# Patient Record
Sex: MALE | Race: WHITE | NOT HISPANIC OR LATINO | Employment: UNEMPLOYED | URBAN - METROPOLITAN AREA
[De-identification: names, ages, dates, MRNs, and addresses within clinical notes are randomized per-mention and may not be internally consistent; named-entity substitution may affect disease eponyms.]

---

## 2019-03-31 ENCOUNTER — HOSPITAL ENCOUNTER (OUTPATIENT)
Facility: MEDICAL CENTER | Age: 59
End: 2019-03-31

## 2019-03-31 ENCOUNTER — HOSPITAL ENCOUNTER (OUTPATIENT)
Dept: RADIOLOGY | Facility: MEDICAL CENTER | Age: 59
End: 2019-03-31

## 2019-04-03 PROBLEM — F17.200 TOBACCO DEPENDENCE: Status: ACTIVE | Noted: 2019-04-03

## 2019-04-03 PROBLEM — I10 MALIGNANT HYPERTENSION: Status: ACTIVE | Noted: 2019-04-03

## 2019-04-03 PROBLEM — F10.20 UNCOMPLICATED ALCOHOL DEPENDENCE (HCC): Status: ACTIVE | Noted: 2019-04-03

## 2019-04-03 PROBLEM — N18.30 CKD (CHRONIC KIDNEY DISEASE) STAGE 3, GFR 30-59 ML/MIN: Status: ACTIVE | Noted: 2019-04-03

## 2019-04-10 PROBLEM — F10.20 UNCOMPLICATED ALCOHOL DEPENDENCE (HCC): Status: RESOLVED | Noted: 2019-04-03 | Resolved: 2019-04-10

## 2019-04-10 PROBLEM — I65.01 OCCLUSION OF RIGHT VERTEBRAL ARTERY: Status: ACTIVE | Noted: 2019-04-10

## 2019-05-10 PROBLEM — J30.2 SEASONAL ALLERGIES: Status: ACTIVE | Noted: 2019-05-10

## 2021-01-14 PROBLEM — I10 MALIGNANT HYPERTENSION: Status: RESOLVED | Noted: 2019-04-03 | Resolved: 2021-01-14

## 2021-02-18 ENCOUNTER — HOSPITAL ENCOUNTER (OUTPATIENT)
Dept: RADIOLOGY | Facility: MEDICAL CENTER | Age: 61
End: 2021-02-18
Payer: MEDICARE
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
## 2021-03-02 ENCOUNTER — OFFICE VISIT (OUTPATIENT)
Dept: SURGICAL ONCOLOGY | Facility: MEDICAL CENTER | Age: 61
End: 2021-03-02
Payer: MEDICARE

## 2021-03-02 VITALS
HEIGHT: 70 IN | DIASTOLIC BLOOD PRESSURE: 64 MMHG | WEIGHT: 201 LBS | TEMPERATURE: 96.6 F | HEART RATE: 61 BPM | SYSTOLIC BLOOD PRESSURE: 132 MMHG | OXYGEN SATURATION: 95 % | BODY MASS INDEX: 28.77 KG/M2

## 2021-03-02 DIAGNOSIS — R10.9 ABDOMINAL PAIN, UNSPECIFIED ABDOMINAL LOCATION: ICD-10-CM

## 2021-03-02 DIAGNOSIS — Z86.19 HX OF HEPATITIS C: ICD-10-CM

## 2021-03-02 DIAGNOSIS — I50.9 CONGESTIVE HEART FAILURE, UNSPECIFIED HF CHRONICITY, UNSPECIFIED HEART FAILURE TYPE (HCC): ICD-10-CM

## 2021-03-02 DIAGNOSIS — K76.6 PORTAL HYPERTENSION (HCC): ICD-10-CM

## 2021-03-02 DIAGNOSIS — K74.60 CIRRHOSIS OF LIVER WITHOUT ASCITES, UNSPECIFIED HEPATIC CIRRHOSIS TYPE (HCC): ICD-10-CM

## 2021-03-02 DIAGNOSIS — R93.5 ABNORMAL CT OF THE ABDOMEN: ICD-10-CM

## 2021-03-02 DIAGNOSIS — C22.0 HEPATOCELLULAR CARCINOMA (HCC): ICD-10-CM

## 2021-03-02 DIAGNOSIS — R93.5 ABNORMAL MRI OF ABDOMEN: ICD-10-CM

## 2021-03-02 PROCEDURE — 99205 OFFICE O/P NEW HI 60 MIN: CPT | Performed by: SURGERY

## 2021-03-02 RX ORDER — HYDRALAZINE HYDROCHLORIDE 50 MG/1
TABLET, FILM COATED ORAL
COMMUNITY
Start: 2021-01-27

## 2021-03-02 RX ORDER — PANTOPRAZOLE SODIUM 40 MG/1
40 TABLET, DELAYED RELEASE ORAL DAILY
COMMUNITY
Start: 2021-02-13

## 2021-03-02 ASSESSMENT — ENCOUNTER SYMPTOMS
ABDOMINAL PAIN: 1
WEIGHT LOSS: 1

## 2021-03-02 ASSESSMENT — FIBROSIS 4 INDEX: FIB4 SCORE: 1.84

## 2021-03-08 ENCOUNTER — PRE-ADMISSION TESTING (OUTPATIENT)
Dept: ADMISSIONS | Facility: MEDICAL CENTER | Age: 61
End: 2021-03-08
Attending: SURGERY
Payer: MEDICARE

## 2021-03-15 DIAGNOSIS — Z01.812 PRE-PROCEDURAL LABORATORY EXAMINATIONS: ICD-10-CM

## 2021-03-15 DIAGNOSIS — R10.9 ABDOMINAL PAIN, UNSPECIFIED ABDOMINAL LOCATION: ICD-10-CM

## 2021-03-15 DIAGNOSIS — C22.0 HEPATOCELLULAR CARCINOMA (HCC): ICD-10-CM

## 2021-03-17 NOTE — OR NURSING
COVID-19 Pre-surgery screenin. Do you have an undiagnosed respiratory illness or symptoms such as coughing or sneezing? No  a. Onset of Sx No  b. Acute vs. chronic respiratory illness No    2. Do you have an unexplained fever greater than 100.4 degrees Fahrenheit or 38 degrees Celsius?     No (Yes/No)    3. Have you had direct exposure to a patient who tested positive for Covid-19?    No    4. Have you had any loss of your sense of taste or smell? Have you had N/V or sore throat? No    Patient has been informed of visitor policy and asked to wear a mask upon entering the hospital   Yes

## 2021-03-18 ENCOUNTER — HOSPITAL ENCOUNTER (OUTPATIENT)
Facility: MEDICAL CENTER | Age: 61
End: 2021-03-18
Attending: SURGERY | Admitting: SURGERY
Payer: MEDICARE

## 2021-03-18 ENCOUNTER — ANESTHESIA EVENT (OUTPATIENT)
Dept: SURGERY | Facility: MEDICAL CENTER | Age: 61
End: 2021-03-18
Payer: MEDICARE

## 2021-03-18 ENCOUNTER — ANESTHESIA (OUTPATIENT)
Dept: SURGERY | Facility: MEDICAL CENTER | Age: 61
End: 2021-03-18
Payer: MEDICARE

## 2021-03-18 VITALS
BODY MASS INDEX: 27.36 KG/M2 | WEIGHT: 191.14 LBS | RESPIRATION RATE: 18 BRPM | TEMPERATURE: 97.4 F | DIASTOLIC BLOOD PRESSURE: 60 MMHG | HEART RATE: 54 BPM | SYSTOLIC BLOOD PRESSURE: 115 MMHG | OXYGEN SATURATION: 94 % | HEIGHT: 70 IN

## 2021-03-18 DIAGNOSIS — C22.0 HEPATOCELLULAR CARCINOMA (HCC): ICD-10-CM

## 2021-03-18 DIAGNOSIS — K76.6 PORTAL HYPERTENSION (HCC): ICD-10-CM

## 2021-03-18 DIAGNOSIS — K74.60 CIRRHOSIS OF LIVER WITHOUT ASCITES, UNSPECIFIED HEPATIC CIRRHOSIS TYPE (HCC): ICD-10-CM

## 2021-03-18 DIAGNOSIS — Z86.19 HX OF HEPATITIS C: ICD-10-CM

## 2021-03-18 PROBLEM — F12.20 MARIJUANA DEPENDENCE (HCC): Status: ACTIVE | Noted: 2021-03-18

## 2021-03-18 LAB
EKG IMPRESSION: NORMAL
PATHOLOGY CONSULT NOTE: NORMAL

## 2021-03-18 PROCEDURE — 110371 HCHG SHELL REV 272: Performed by: SURGERY

## 2021-03-18 PROCEDURE — 160046 HCHG PACU - 1ST 60 MINS PHASE II: Performed by: SURGERY

## 2021-03-18 PROCEDURE — 700102 HCHG RX REV CODE 250 W/ 637 OVERRIDE(OP): Performed by: ANESTHESIOLOGY

## 2021-03-18 PROCEDURE — 93005 ELECTROCARDIOGRAM TRACING: CPT | Performed by: SURGERY

## 2021-03-18 PROCEDURE — 160035 HCHG PACU - 1ST 60 MINS PHASE I: Performed by: SURGERY

## 2021-03-18 PROCEDURE — 501571 HCHG TROCAR, SEPARATOR 12X100: Performed by: SURGERY

## 2021-03-18 PROCEDURE — 501583 HCHG TROCAR, THRD CAN&SEAL 5X100: Performed by: SURGERY

## 2021-03-18 PROCEDURE — 700105 HCHG RX REV CODE 258: Performed by: ANESTHESIOLOGY

## 2021-03-18 PROCEDURE — 160041 HCHG SURGERY MINUTES - EA ADDL 1 MIN LEVEL 4: Performed by: SURGERY

## 2021-03-18 PROCEDURE — 88307 TISSUE EXAM BY PATHOLOGIST: CPT

## 2021-03-18 PROCEDURE — A9270 NON-COVERED ITEM OR SERVICE: HCPCS | Performed by: ANESTHESIOLOGY

## 2021-03-18 PROCEDURE — 93010 ELECTROCARDIOGRAM REPORT: CPT | Performed by: INTERNAL MEDICINE

## 2021-03-18 PROCEDURE — 700101 HCHG RX REV CODE 250: Performed by: ANESTHESIOLOGY

## 2021-03-18 PROCEDURE — 160009 HCHG ANES TIME/MIN: Performed by: SURGERY

## 2021-03-18 PROCEDURE — 47370 LAPARO ABLATE LIVER TUMOR RF: CPT | Performed by: SURGERY

## 2021-03-18 PROCEDURE — 501838 HCHG SUTURE GENERAL: Performed by: SURGERY

## 2021-03-18 PROCEDURE — 160029 HCHG SURGERY MINUTES - 1ST 30 MINS LEVEL 4: Performed by: SURGERY

## 2021-03-18 PROCEDURE — 700111 HCHG RX REV CODE 636 W/ 250 OVERRIDE (IP): Performed by: ANESTHESIOLOGY

## 2021-03-18 PROCEDURE — 160047 HCHG PACU  - EA ADDL 30 MINS PHASE II: Performed by: SURGERY

## 2021-03-18 PROCEDURE — 160002 HCHG RECOVERY MINUTES (STAT): Performed by: SURGERY

## 2021-03-18 PROCEDURE — 76940 US GUIDE TISSUE ABLATION: CPT | Mod: 26 | Performed by: SURGERY

## 2021-03-18 PROCEDURE — 501570 HCHG TROCAR, SEPARATOR: Performed by: SURGERY

## 2021-03-18 PROCEDURE — 160025 RECOVERY II MINUTES (STATS): Performed by: SURGERY

## 2021-03-18 PROCEDURE — 160048 HCHG OR STATISTICAL LEVEL 1-5: Performed by: SURGERY

## 2021-03-18 PROCEDURE — 700105 HCHG RX REV CODE 258: Performed by: SURGERY

## 2021-03-18 PROCEDURE — A9270 NON-COVERED ITEM OR SERVICE: HCPCS | Performed by: SURGERY

## 2021-03-18 PROCEDURE — 47100 WEDGE BIOPSY OF LIVER: CPT | Performed by: SURGERY

## 2021-03-18 PROCEDURE — 700101 HCHG RX REV CODE 250: Performed by: SURGERY

## 2021-03-18 PROCEDURE — 502571 HCHG PACK, LAP CHOLE: Performed by: SURGERY

## 2021-03-18 PROCEDURE — 501568 HCHG TROCAR, BLUNTPORT 12MM: Performed by: SURGERY

## 2021-03-18 DEVICE — GLUE BIOGLUE 2CC PRE-FILL (5EA/PK - 4 TIPS PER SYRINGE): Type: IMPLANTABLE DEVICE | Status: FUNCTIONAL

## 2021-03-18 RX ORDER — GLYCOPYRROLATE 0.2 MG/ML
INJECTION INTRAMUSCULAR; INTRAVENOUS PRN
Status: DISCONTINUED | OUTPATIENT
Start: 2021-03-18 | End: 2021-03-18 | Stop reason: SURG

## 2021-03-18 RX ORDER — TRAMADOL HYDROCHLORIDE 50 MG/1
50-100 TABLET ORAL EVERY 4 HOURS PRN
Qty: 40 TABLET | Refills: 1 | Status: SHIPPED | OUTPATIENT
Start: 2021-03-18 | End: 2021-03-28

## 2021-03-18 RX ORDER — HALOPERIDOL 5 MG/ML
1 INJECTION INTRAMUSCULAR
Status: DISCONTINUED | OUTPATIENT
Start: 2021-03-18 | End: 2021-03-18 | Stop reason: HOSPADM

## 2021-03-18 RX ORDER — DIGOXIN 125 MCG
125 TABLET ORAL EVERY EVENING
COMMUNITY
Start: 2020-11-25

## 2021-03-18 RX ORDER — HYDROMORPHONE HYDROCHLORIDE 1 MG/ML
0.1 INJECTION, SOLUTION INTRAMUSCULAR; INTRAVENOUS; SUBCUTANEOUS
Status: DISCONTINUED | OUTPATIENT
Start: 2021-03-18 | End: 2021-03-18 | Stop reason: HOSPADM

## 2021-03-18 RX ORDER — DIPHENHYDRAMINE HYDROCHLORIDE 50 MG/ML
12.5 INJECTION INTRAMUSCULAR; INTRAVENOUS
Status: DISCONTINUED | OUTPATIENT
Start: 2021-03-18 | End: 2021-03-18 | Stop reason: HOSPADM

## 2021-03-18 RX ORDER — MIDAZOLAM HYDROCHLORIDE 1 MG/ML
1 INJECTION INTRAMUSCULAR; INTRAVENOUS
Status: DISCONTINUED | OUTPATIENT
Start: 2021-03-18 | End: 2021-03-18 | Stop reason: HOSPADM

## 2021-03-18 RX ORDER — SODIUM CHLORIDE, SODIUM LACTATE, POTASSIUM CHLORIDE, CALCIUM CHLORIDE 600; 310; 30; 20 MG/100ML; MG/100ML; MG/100ML; MG/100ML
INJECTION, SOLUTION INTRAVENOUS CONTINUOUS
Status: DISCONTINUED | OUTPATIENT
Start: 2021-03-18 | End: 2021-03-18 | Stop reason: HOSPADM

## 2021-03-18 RX ORDER — BUPIVACAINE HYDROCHLORIDE AND EPINEPHRINE 5; 5 MG/ML; UG/ML
INJECTION, SOLUTION PERINEURAL
Status: DISCONTINUED | OUTPATIENT
Start: 2021-03-18 | End: 2021-03-18 | Stop reason: HOSPADM

## 2021-03-18 RX ORDER — METOPROLOL TARTRATE 1 MG/ML
1 INJECTION, SOLUTION INTRAVENOUS
Status: DISCONTINUED | OUTPATIENT
Start: 2021-03-18 | End: 2021-03-18 | Stop reason: HOSPADM

## 2021-03-18 RX ORDER — LABETALOL HYDROCHLORIDE 5 MG/ML
5 INJECTION, SOLUTION INTRAVENOUS
Status: DISCONTINUED | OUTPATIENT
Start: 2021-03-18 | End: 2021-03-18 | Stop reason: HOSPADM

## 2021-03-18 RX ORDER — HYDROMORPHONE HYDROCHLORIDE 1 MG/ML
0.2 INJECTION, SOLUTION INTRAMUSCULAR; INTRAVENOUS; SUBCUTANEOUS
Status: DISCONTINUED | OUTPATIENT
Start: 2021-03-18 | End: 2021-03-18 | Stop reason: HOSPADM

## 2021-03-18 RX ORDER — CEFAZOLIN SODIUM 1 G/3ML
INJECTION, POWDER, FOR SOLUTION INTRAMUSCULAR; INTRAVENOUS PRN
Status: DISCONTINUED | OUTPATIENT
Start: 2021-03-18 | End: 2021-03-18 | Stop reason: SURG

## 2021-03-18 RX ORDER — ONDANSETRON 2 MG/ML
4 INJECTION INTRAMUSCULAR; INTRAVENOUS
Status: DISCONTINUED | OUTPATIENT
Start: 2021-03-18 | End: 2021-03-18 | Stop reason: HOSPADM

## 2021-03-18 RX ORDER — SUCCINYLCHOLINE/SOD CL,ISO/PF 200MG/10ML
SYRINGE (ML) INTRAVENOUS PRN
Status: DISCONTINUED | OUTPATIENT
Start: 2021-03-18 | End: 2021-03-18 | Stop reason: SURG

## 2021-03-18 RX ORDER — CLONIDINE 0.3 MG/24H
1 PATCH, EXTENDED RELEASE TRANSDERMAL
COMMUNITY

## 2021-03-18 RX ORDER — NEOSTIGMINE METHYLSULFATE 1 MG/ML
INJECTION, SOLUTION INTRAVENOUS PRN
Status: DISCONTINUED | OUTPATIENT
Start: 2021-03-18 | End: 2021-03-18 | Stop reason: SURG

## 2021-03-18 RX ORDER — LIDOCAINE HYDROCHLORIDE 20 MG/ML
INJECTION, SOLUTION EPIDURAL; INFILTRATION; INTRACAUDAL; PERINEURAL PRN
Status: DISCONTINUED | OUTPATIENT
Start: 2021-03-18 | End: 2021-03-18 | Stop reason: SURG

## 2021-03-18 RX ORDER — IPRATROPIUM BROMIDE AND ALBUTEROL SULFATE 2.5; .5 MG/3ML; MG/3ML
3 SOLUTION RESPIRATORY (INHALATION)
Status: DISCONTINUED | OUTPATIENT
Start: 2021-03-18 | End: 2021-03-18 | Stop reason: HOSPADM

## 2021-03-18 RX ORDER — OXYCODONE HCL 5 MG/5 ML
10 SOLUTION, ORAL ORAL
Status: COMPLETED | OUTPATIENT
Start: 2021-03-18 | End: 2021-03-18

## 2021-03-18 RX ORDER — HYDRALAZINE HYDROCHLORIDE 20 MG/ML
5 INJECTION INTRAMUSCULAR; INTRAVENOUS
Status: DISCONTINUED | OUTPATIENT
Start: 2021-03-18 | End: 2021-03-18 | Stop reason: HOSPADM

## 2021-03-18 RX ORDER — DOXAZOSIN 8 MG/1
8 TABLET ORAL EVERY EVENING
COMMUNITY
Start: 2021-01-18

## 2021-03-18 RX ORDER — MEPERIDINE HYDROCHLORIDE 25 MG/ML
12.5 INJECTION INTRAMUSCULAR; INTRAVENOUS; SUBCUTANEOUS
Status: DISCONTINUED | OUTPATIENT
Start: 2021-03-18 | End: 2021-03-18 | Stop reason: HOSPADM

## 2021-03-18 RX ORDER — ROCURONIUM BROMIDE 10 MG/ML
INJECTION, SOLUTION INTRAVENOUS PRN
Status: DISCONTINUED | OUTPATIENT
Start: 2021-03-18 | End: 2021-03-18 | Stop reason: SURG

## 2021-03-18 RX ORDER — OXYCODONE HCL 5 MG/5 ML
5 SOLUTION, ORAL ORAL
Status: COMPLETED | OUTPATIENT
Start: 2021-03-18 | End: 2021-03-18

## 2021-03-18 RX ORDER — PROMETHAZINE HYDROCHLORIDE 25 MG/1
12.5 TABLET ORAL EVERY 6 HOURS PRN
Qty: 30 TABLET | Refills: 0 | Status: SHIPPED | OUTPATIENT
Start: 2021-03-18

## 2021-03-18 RX ORDER — HYDROMORPHONE HYDROCHLORIDE 1 MG/ML
0.4 INJECTION, SOLUTION INTRAMUSCULAR; INTRAVENOUS; SUBCUTANEOUS
Status: DISCONTINUED | OUTPATIENT
Start: 2021-03-18 | End: 2021-03-18 | Stop reason: HOSPADM

## 2021-03-18 RX ORDER — SODIUM CHLORIDE, SODIUM LACTATE, POTASSIUM CHLORIDE, CALCIUM CHLORIDE 600; 310; 30; 20 MG/100ML; MG/100ML; MG/100ML; MG/100ML
INJECTION, SOLUTION INTRAVENOUS CONTINUOUS
Status: DISCONTINUED | OUTPATIENT
Start: 2021-03-18 | End: 2021-03-18

## 2021-03-18 RX ADMIN — SODIUM CHLORIDE, POTASSIUM CHLORIDE, SODIUM LACTATE AND CALCIUM CHLORIDE: 600; 310; 30; 20 INJECTION, SOLUTION INTRAVENOUS at 08:30

## 2021-03-18 RX ADMIN — FENTANYL CITRATE 100 MCG: 50 INJECTION, SOLUTION INTRAMUSCULAR; INTRAVENOUS at 07:42

## 2021-03-18 RX ADMIN — OXYCODONE HYDROCHLORIDE 10 MG: 5 SOLUTION ORAL at 08:28

## 2021-03-18 RX ADMIN — FENTANYL CITRATE 100 MCG: 50 INJECTION, SOLUTION INTRAMUSCULAR; INTRAVENOUS at 07:14

## 2021-03-18 RX ADMIN — LIDOCAINE HYDROCHLORIDE 0.5 ML: 10 INJECTION, SOLUTION EPIDURAL; INFILTRATION; INTRACAUDAL at 05:51

## 2021-03-18 RX ADMIN — NEOSTIGMINE METHYLSULFATE 3 MG: 1 INJECTION INTRAVENOUS at 08:10

## 2021-03-18 RX ADMIN — FENTANYL CITRATE 50 MCG: 50 INJECTION, SOLUTION INTRAMUSCULAR; INTRAVENOUS at 08:12

## 2021-03-18 RX ADMIN — LIDOCAINE HYDROCHLORIDE 100 MG: 20 INJECTION, SOLUTION EPIDURAL; INFILTRATION; INTRACAUDAL at 07:05

## 2021-03-18 RX ADMIN — GLYCOPYRROLATE 0.2 MG: 0.2 INJECTION INTRAMUSCULAR; INTRAVENOUS at 08:10

## 2021-03-18 RX ADMIN — GLYCOPYRROLATE 0.2 MG: 0.2 INJECTION INTRAMUSCULAR; INTRAVENOUS at 07:22

## 2021-03-18 RX ADMIN — ROCURONIUM BROMIDE 20 MG: 10 INJECTION, SOLUTION INTRAVENOUS at 07:22

## 2021-03-18 RX ADMIN — Medication 140 MG: at 07:05

## 2021-03-18 RX ADMIN — EPHEDRINE SULFATE 10 MG: 50 INJECTION, SOLUTION INTRAVENOUS at 07:28

## 2021-03-18 RX ADMIN — PROPOFOL 200 MG: 10 INJECTION, EMULSION INTRAVENOUS at 07:05

## 2021-03-18 RX ADMIN — SODIUM CHLORIDE, POTASSIUM CHLORIDE, SODIUM LACTATE AND CALCIUM CHLORIDE: 600; 310; 30; 20 INJECTION, SOLUTION INTRAVENOUS at 06:38

## 2021-03-18 RX ADMIN — GLYCOPYRROLATE 0.2 MG: 0.2 INJECTION INTRAMUSCULAR; INTRAVENOUS at 07:20

## 2021-03-18 RX ADMIN — ROCURONIUM BROMIDE 10 MG: 10 INJECTION, SOLUTION INTRAVENOUS at 07:05

## 2021-03-18 RX ADMIN — POVIDONE IODINE 15 ML: 100 SOLUTION TOPICAL at 05:51

## 2021-03-18 RX ADMIN — CEFAZOLIN 2 G: 330 INJECTION, POWDER, FOR SOLUTION INTRAMUSCULAR; INTRAVENOUS at 07:11

## 2021-03-18 ASSESSMENT — FIBROSIS 4 INDEX: FIB4 SCORE: 1.84
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
## 2021-03-18 NOTE — OR NURSING
Continues to take po well. vss remain stable.  Dc instructions given and verbalized understanding.  To  scripts at [facility] in [place]. Wheeled to car.  Home with friend.

## 2021-03-18 NOTE — ANESTHESIA PROCEDURE NOTES
Airway    Date/Time: 3/18/2021 7:06 AM  Performed by: Balta Tipton M.D.  Authorized by: Balta Tipton M.D.     Location:  OR  Urgency:  Elective  Difficult Airway: No    Indications for Airway Management:  Anesthesia      Spontaneous Ventilation: absent    Sedation Level:  Deep  Preoxygenated: Yes    Patient Position:  Sniffing  Mask Difficulty Assessment:  2 - vent by mask + OA or adjuvant +/- NMBA  Final Airway Type:  Endotracheal airway  Final Endotracheal Airway:  ETT  Cuffed: Yes    Technique Used for Successful ETT Placement:  Direct laryngoscopy    Insertion Site:  Oral  Blade Type:  Lynch  Laryngoscope Blade/Videolaryngoscope Blade Size:  3  ETT Size (mm):  8.0  Measured from:  Gums  ETT to Gums (cm):  23  Placement Verified by: auscultation and capnometry    Cormack-Lehane Classification:  Grade I - full view of glottis  Number of Attempts at Approach:  1

## 2021-03-18 NOTE — ANESTHESIA TIME REPORT
Anesthesia Start and Stop Event Times     Date Time Event    3/18/2021 0655 Ready for Procedure     0702 Anesthesia Start     0821 Anesthesia Stop        Responsible Staff  03/18/21    Name Role Begin End    Balta Tipton M.D. Anesth 0702 0821        Preop Diagnosis (Free Text):  Pre-op Diagnosis     LIVER CANCER        Preop Diagnosis (Codes):    Post op Diagnosis  Liver cancer (HCC)      Premium Reason  Non-Premium    Comments:

## 2021-03-18 NOTE — ANESTHESIA POSTPROCEDURE EVALUATION
Patient: Brian Myles    Procedure Summary     Date: 03/18/21 Room / Location: [place] [address] / SURGERY [facility]    Anesthesia Start: 0702 Anesthesia Stop: 0821    Procedures:       ABLATION, LESION, LIVER, LAPAROSCOPIC - SEGMENT 2/3 TUMOR. (Abdomen)      BIOPSY, LIVER (Abdomen) Diagnosis: (LIVER CANCER)    Surgeons: Chiki Chadwick M.D. Responsible Provider: Balta Tipton M.D.    Anesthesia Type: general ASA Status: 3          Final Anesthesia Type: general  Last vitals  BP   Blood Pressure: 119/57    Temp   36.6 °C (97.9 °F)    Pulse   63   Resp   18    SpO2   92 %      Anesthesia Post Evaluation    Patient location during evaluation: PACU  Patient participation: complete - patient participated  Level of consciousness: awake and alert    Airway patency: patent  Anesthetic complications: no  Cardiovascular status: hemodynamically stable  Respiratory status: acceptable  Hydration status: euvolemic    PONV: none          No complications documented.     Nurse Pain Score: 0 (NPRS)

## 2021-03-18 NOTE — DISCHARGE INSTRUCTIONS
ACTIVITY: Rest and take it easy for the first 24 hours.  A responsible adult is recommended to remain with you during that time.  It is normal to feel sleepy.  We encourage you to not do anything that requires balance, judgment or coordination.    MILD FLU-LIKE SYMPTOMS ARE NORMAL. YOU MAY EXPERIENCE GENERALIZED MUSCLE ACHES, THROAT IRRITATION, HEADACHE AND/OR SOME NAUSEA.    FOR 24 HOURS DO NOT:  Drive, operate machinery or run household appliances.  Drink beer or alcoholic beverages.   Make important decisions or sign legal documents.    OK to shower in AM with dressings on  DC dressing in 6 days  Follow-up with office MA for staple removal after 10-14 days  Follow up with Dr. Chadwick after completing 1 month  CT of liver imaging    DIET: To avoid nausea, slowly advance diet as tolerated, avoiding spicy or greasy foods for the first day.  Add more substantial food to your diet according to your physician's instructions.  Babies can be fed formula or breast milk as soon as they are hungry.  INCREASE FLUIDS AND FIBER TO AVOID CONSTIPATION.    FOLLOW-UP APPOINTMENT:  A follow-up appointment should be arranged with your doctor in 10 to 14 days; call to schedule.    You should CALL YOUR PHYSICIAN if you develop:  Fever greater than 101 degrees F.  Pain not relieved by medication, or persistent nausea or vomiting.  Excessive bleeding (blood soaking through dressing) or unexpected drainage from the wound.  Extreme redness or swelling around the incision site, drainage of pus or foul smelling drainage.  Inability to urinate or empty your bladder within 8 hours.  Problems with breathing or chest pain.    You should call 911 if you develop problems with breathing or chest pain.  If you are unable to contact your doctor or surgical center, you should go to the nearest emergency room or urgent care center.      Physician's telephone #: [phone] Dr. Chadwick    If any questions arise, call your doctor.  If your  doctor is not available, please feel free to call the Surgical Center at [phone]. The Contact Center is open Monday through Friday 7AM to 5PM and may speak to a nurse at [phone], or toll free at [phone].     A registered nurse may call you a few days after your surgery to see how you are doing after your procedure.    MEDICATIONS: Resume taking daily medication.  Take prescribed pain medication with food.  If no medication is prescribed, you may take non-aspirin pain medication if needed.  PAIN MEDICATION CAN BE VERY CONSTIPATING.  Take a stool softener or laxative such as senokot, pericolace, or milk of magnesia if needed.    Prescription given for Phenergan, Ultram, at pharmacy in [facility].  Last pain medication given at 8:28 AM.    If your physician has prescribed pain medication that includes Acetaminophen (Tylenol), do not take additional Acetaminophen (Tylenol) while taking the prescribed medication.    Depression / Suicide Risk    As you are discharged from this [facility] facility, it is important to learn how to keep safe from harming yourself.    Recognize the warning signs:  · Abrupt changes in personality, positive or negative- including increase in energy   · Giving away possessions  · Change in eating patterns- significant weight changes-  positive or negative  · Change in sleeping patterns- unable to sleep or sleeping all the time   · Unwillingness or inability to communicate  · Depression  · Unusual sadness, discouragement and loneliness  · Talk of wanting to die  · Neglect of personal appearance   · Rebelliousness- reckless behavior  · Withdrawal from people/activities they love  · Confusion- inability to concentrate     If you or a loved one observes any of these behaviors or has concerns about self-harm, here's what you can do:  · Talk about it- your feelings and reasons for harming yourself  · Remove any means that you might use to hurt yourself (examples:  pills, rope, extension cords, firearm)  · Get professional help from the community (Mental Health, Substance Abuse, psychological counseling)  · Do not be alone:Call your Safe Contact- someone whom you trust who will be there for you.  · Call your local CRISIS HOTLINE 125-6195 or 301-820-2000  · Call your local Children's Mobile Crisis Response Team [place] (135) 757-6569 or www.[employer]  · Call the toll free National Suicide Prevention Hotlines   · National Suicide Prevention Lifeline 284-748-BDYU (1500)  National Hope Line Network 800-SUICIDE (335-9186)
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
## 2021-03-22 ENCOUNTER — PATIENT OUTREACH (OUTPATIENT)
Dept: OTHER | Facility: MEDICAL CENTER | Age: 61
End: 2021-03-22

## 2021-03-22 NOTE — PROGRESS NOTES
On 3/22/2021 at 1526 this ONN called patient. Patient states he is doing well. Patient states he feels good. Patient verifies that he has Dr. Chadwick' office phone number. Patient states he knows to call and make a follow-up appointment to be seen in two weeks. This ONN reinforced importance of making and keeping follow-up appointment. Patient made verbal understanding. Patient denies questions or concerns, thanked ONN for calling.

## 2021-04-05 NOTE — PROGRESS NOTES
Subjective:      Primary care physician:Oscar Araiza P.A.-C.  Referring Provider: Balta Downey MD      Chief Complaint: No chief complaint on file.    Diagnosis:   1. Hepatocellular carcinoma (HCC)     2. Abdominal pain, unspecified abdominal location     3. Cirrhosis of liver without ascites, unspecified hepatic cirrhosis type (HCC)         History of presenting illness:  Brian Myles  is a pleasant 61 y.o. year old male who presented with postop visit for his staple removal.  He denies any fever or chills, nausea or vomiting.  He has no pain.  He is here for staple removal.  He will be scheduled for a repeat CT scan in 2 weeks.      Past Medical History:   Diagnosis Date   • Cancer (HCC) 2021    liver ?   • Dental disorder 2021    only 4 teeth   • Hepatitis C 2000   • Hypertension    • Myocardial infarct (HCC) 09/2019    open heart CABG & maze   • Pneumonia 1990   • Stroke (HCC) 05/31/2019     Past Surgical History:   Procedure Laterality Date   • HEPATIC ABLATION LAPAROSCOPIC  3/18/2021    Procedure: ABLATION, LESION, LIVER, LAPAROSCOPIC - SEGMENT 2/3 TUMOR.;  Surgeon: Chiki Chawdick M.D.;  Location: SURGERY [facility];  Service: General   • LIVER BIOPSY  3/18/2021    Procedure: BIOPSY, LIVER WEDGE;  Surgeon: Chiki Chadwick M.D.;  Location: SURGERY [facility];  Service: General   • OTHER CARDIAC SURGERY  2019    CABG x3   • OTHER ORTHOPEDIC SURGERY Right 1978    knee replacement     No Known Allergies  Outpatient Encounter Medications as of 4/6/2021   Medication Sig Dispense Refill   • cloNIDine (CATAPRES) 0.3 MG/24HR PATCH WEEKLY patch Place 1 Patch on the skin every 7 days.     • doxazosin (CARDURA) 8 MG tablet Take 8 mg by mouth every evening.     • digoxin (LANOXIN) 125 MCG Tab Take 125 mcg by mouth every evening.     • promethazine (PHENERGAN) 25 MG Tab Take 0.5 Tablets by mouth every 6 hours as needed. 30 tablet 0   • pantoprazole (PROTONIX) 40 MG Tablet Delayed Response  Take 40 mg by mouth every day.     • aspirin EC (ECOTRIN) 81 MG Tablet Delayed Response Take 81 mg by mouth every evening.     • hydrALAZINE (APRESOLINE) 50 MG Tab Take 1 tablet by mouth three times daily     • IRON CR PO Take 1 tablet by mouth every evening. OTC     • amiodarone (CORDARONE) 200 MG Tab Take 200 mg by mouth every evening.     • lisinopril (PRINIVIL) 40 MG tablet Take 1 Tab by mouth every day. 90 Tab 3   • atorvastatin (LIPITOR) 80 MG tablet Take 1 Tab by mouth every evening. 30 Tab 5   • carvedilol (COREG) 25 MG Tab Take 1 Tab by mouth 2 times a day, with meals. 60 Tab 5   • doxazosin (CARDURA) 4 MG Tab Take 0.5 Tabs by mouth 2 Times a Day. (Patient not taking: Reported on 3/18/2021) 30 Tab 5   • aspirin  MG Tablet Delayed Response Take 1 Tab by mouth every day. (Patient not taking: Reported on 3/18/2021) 30 Tab 5     No facility-administered encounter medications on file as of 4/6/2021.     Social History     Socioeconomic History   • Marital status: Single     Spouse name: Not on file   • Number of children: Not on file   • Years of education: Not on file   • Highest education level: Not on file   Occupational History   • Not on file   Tobacco Use   • Smoking status: Heavy Tobacco Smoker     Packs/day: 1.50     Years: 52.00     Pack years: 78.00     Types: Cigarettes   • Smokeless tobacco: Never Used   Substance and Sexual Activity   • Alcohol use: Not Currently     Alcohol/week: 3.6 oz     Types: 6 Cans of beer per week     Comment: last new years 2021   • Drug use: Yes     Types: Inhaled     Comment: marijuana since 1968 daily, 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   • Sexual activity: Never   Other Topics Concern   • Not on file   Social History Narrative   • Not on file     Social Determinants of Health     Financial Resource Strain:    • Difficulty of Paying Living Expenses:    Food Insecurity:    • Worried About Running Out of Food in the Last Year:    • Ran Out of Food in the Last Year:     Transportation Needs:    • Lack of Transportation (Medical):    • Lack of Transportation (Non-Medical):    Physical Activity:    • Days of Exercise per Week:    • Minutes of Exercise per Session:    Stress:    • Feeling of Stress :    Social Connections:    • Frequency of Communication with Friends and Family:    • Frequency of Social Gatherings with Friends and Family:    • Attends Buddhist Services:    • Active Member of Clubs or Organizations:    • Attends Club or Organization Meetings:    • Marital Status:    Intimate Partner Violence:    • Fear of Current or Ex-Partner:    • Emotionally Abused:    • Physically Abused:    • Sexually Abused:       Social History     Tobacco Use   Smoking Status Heavy Tobacco Smoker   • Packs/day: 1.50   • Years: 52.00   • Pack years: 78.00   • Types: Cigarettes   Smokeless Tobacco Never Used     Social History     Substance and Sexual Activity   Alcohol Use Not Currently   • Alcohol/week: 3.6 oz   • Types: 6 Cans of beer per week    Comment: last new years 2021     Social History     Substance and Sexual Activity   Drug Use Yes   • Types: Inhaled    Comment: marijuana since 1968 daily, 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  No pertinent family history on file    Review of Systems   All other systems reviewed and are negative.       Objective:   There were no vitals taken for this visit.    Physical Exam   Abdominal: Soft. Bowel sounds are normal.   Abdomen is soft, dry, and intact including his incisions.  He has no pain.  Staples are removed here in the office and Steri-Strips and benzoin were applied.   Nursing note and vitals reviewed.      Labs: 3/2/21  WBC Count 3.7 - 10.6 x10^3/ul 6.2    RBC Count 4.50 - 5.70 x10^6/ul 5.25    HGB 13.0 - 16.7 g/dl 16.7    Hct 38.8 - 49.7 % 49.1    MCV 83.0 - 99.0 fl 93.6    MCH 28.0 - 33.8 pg 31.9    MCHC 33.1 - 36.5 g/dL 34.1    RDW 11.8 - 14.0 % 14.2High     Platelet Count 146 - 390 x10^3/uL 146    MPV 6.4 -  10.2 fl 9.4      Sodium 136 - 144 mmol/L 134Low     POTASSIUM 3.6 - 5.1 mmol/L 4.2    Chloride 101 - 111 mmol/L 100Low     CARBON DIOXIDE 22 - 32 mmol/L 25    Anion Gap 2 - 11 mmol/L 10    Calcium, S/P 8.7 - 10.3 mg/dl 8.7Low     Glucose, Fasting 60 - 99 mg/dl 101High     BUN 8 - 20 mg/dl 17    Creatinine 0.80 - 1.40 mg/dl 1.73High     Protein, Total 6.4 - 8.2 g/dl 7.0    Albumin 3.5 - 4.8 g/dl 3.7    ALKALINE PHOSPHATASE 38 - 126 IU/L 57    ALT 17 - 63 IU/L 70High     AST 15 - 41 IU/L 46High     Bilirubin, Total 0.4 - 2.0 mg/dl 1.0    BILIRUBIN, DIRECT 0.0 - 0.3 mg/dl 0.3    Indirect Bilirubin 0.0 - 1.5 mg/dl 0.7    Globulin 2.6 - 3.1 g/dl 3.3High     Albumin/Globulin Ratio 1.00 - 2.20 Ratio 1.12    EGFRE Calc >60 ml/min/1.7 40      Alpha Fetoprotein L3% 0.5 - 9.9 % 29.2High      Vipul-gamma-carboxy Prothrombin <7.5 ng/mL 7.2      Imagin21 MRI      Per my read,   Left liver masses compatible with a hepatocellular carcinoma given the    background of cirrhosis.        Trace left pleural effusion.        Left renal scarring with left renal cysts.        21 CT      1. Cirrhotic liver with exophytic left hepatic lobe mass which is not further    characterized without IV contrast, however is concerning for hepatocellular    carcinoma. Multiphase MR versus CT is recommended if the patient is able to    tolerate IV contrast.        2. Left renal scarring.             Pathology: 3/18/21    FINAL DIAGNOSIS:     A. Liver tumor:          Well to moderately differentiated hepatocellular carcinoma.     Procedures: 3/18/21    1.  Laparoscopic microwave thermal ablation of a 3.5 cm left-sided hepatoma   between segment 2 and 3 posteriorly using multiple 4 minute at 100 watt   ablation cavities giving us approximately 4 x 4.5 cm ablation cavity   overlapping.  2.  Liver wedge resection of tumor for pathology segment 2/3    Diagnosis:     1. Hepatocellular carcinoma (HCC)     2. Abdominal pain, unspecified abdominal  location     3. Cirrhosis of liver without ascites, unspecified hepatic cirrhosis type (HCC)             Medical Decision Making:  Today's Assessment / Status / Plan:     In light of the present findings, the patient will undergo repeat surveillance imaging following his ablation in 2 weeks and then see me after 18 April.  I have instructed him with an appointment already and he knows that he will get called.  I am also recommending that the patient get a referral to GI consultants near him so that he could start on treatment of his hep C.  He is very anxious to do that treatment so that he can think prove his liver function.  We will see him in 3 weeks.    I, Dr. Chadwick have entered, reviewed and confirmed the above diagnoses related to this patient on this date of service, 4/6/2021  2:05 PM.    He agreed and verbalized his agreement and understanding with the current plan. I answered all questions and concerns he has at this time and advised him to call at any time in the interim with questions or concerns in regards to his care.    Thank you for allowing me to participate in his care, I will continue to follow closely.       Please note that this dictation was created using voice recognition software. I have made every reasonable attempt to correct obvious errors, but I expect that there are errors of grammar and possibly content that I did not discover before finalizing the note.     Thank you for this consultation and allowing me to participate in your patient's care. If I can be of further service please contact my office.

## 2021-04-06 ENCOUNTER — OFFICE VISIT (OUTPATIENT)
Dept: SURGICAL ONCOLOGY | Facility: MEDICAL CENTER | Age: 61
End: 2021-04-06
Payer: MEDICARE

## 2021-04-06 VITALS
WEIGHT: 185 LBS | OXYGEN SATURATION: 95 % | BODY MASS INDEX: 28.04 KG/M2 | HEART RATE: 65 BPM | HEIGHT: 68 IN | TEMPERATURE: 97 F | DIASTOLIC BLOOD PRESSURE: 78 MMHG | SYSTOLIC BLOOD PRESSURE: 132 MMHG

## 2021-04-06 DIAGNOSIS — R10.9 ABDOMINAL PAIN, UNSPECIFIED ABDOMINAL LOCATION: ICD-10-CM

## 2021-04-06 DIAGNOSIS — C22.0 HEPATOCELLULAR CARCINOMA (HCC): ICD-10-CM

## 2021-04-06 DIAGNOSIS — K74.60 CIRRHOSIS OF LIVER WITHOUT ASCITES, UNSPECIFIED HEPATIC CIRRHOSIS TYPE (HCC): ICD-10-CM

## 2021-04-06 PROCEDURE — 99024 POSTOP FOLLOW-UP VISIT: CPT | Performed by: SURGERY

## 2021-04-06 ASSESSMENT — FIBROSIS 4 INDEX: FIB4 SCORE: 1.84

## 2021-04-19 ENCOUNTER — HOSPITAL ENCOUNTER (OUTPATIENT)
Dept: RADIOLOGY | Facility: MEDICAL CENTER | Age: 61
End: 2021-04-19
Payer: MEDICARE

## 2021-04-19 NOTE — PROGRESS NOTES
Subjective:      Primary care physician:Oscar Araiza P.A.-C.  Referring Provider: Balta Downey MD      Chief Complaint: No chief complaint on file.    Diagnosis:   1. Hepatocellular carcinoma (HCC)     2. Cirrhosis of liver without ascites, unspecified hepatic cirrhosis type (HCC)     3. Hx of hepatitis C         History of presenting illness:  Brian Myles  is a pleasant 61 y.o. year old male who presented with ***      Past Medical History:   Diagnosis Date   • Cancer (HCC) 2021    liver ?   • Dental disorder 2021    only 4 teeth   • Hepatitis C 2000   • Hypertension    • Myocardial infarct (HCC) 09/2019    open heart CABG & maze   • Pneumonia 1990   • Stroke (HCC) 05/31/2019     Past Surgical History:   Procedure Laterality Date   • HEPATIC ABLATION LAPAROSCOPIC  3/18/2021    Procedure: ABLATION, LESION, LIVER, LAPAROSCOPIC - SEGMENT 2/3 TUMOR.;  Surgeon: Chiki Chadwick M.D.;  Location: SURGERY [facility];  Service: General   • LIVER BIOPSY  3/18/2021    Procedure: BIOPSY, LIVER WEDGE;  Surgeon: Chiki Chadwick M.D.;  Location: SURGERY [facility];  Service: General   • OTHER CARDIAC SURGERY  2019    CABG x3   • OTHER ORTHOPEDIC SURGERY Right 1978    knee replacement     No Known Allergies  Outpatient Encounter Medications as of 4/20/2021   Medication Sig Dispense Refill   • cloNIDine (CATAPRES) 0.3 MG/24HR PATCH WEEKLY patch Place 1 Patch on the skin every 7 days.     • doxazosin (CARDURA) 8 MG tablet Take 8 mg by mouth every evening.     • digoxin (LANOXIN) 125 MCG Tab Take 125 mcg by mouth every evening.     • promethazine (PHENERGAN) 25 MG Tab Take 0.5 Tablets by mouth every 6 hours as needed. 30 tablet 0   • pantoprazole (PROTONIX) 40 MG Tablet Delayed Response Take 40 mg by mouth every day.     • aspirin EC (ECOTRIN) 81 MG Tablet Delayed Response Take 81 mg by mouth every evening.     • hydrALAZINE (APRESOLINE) 50 MG Tab Take 1 tablet by mouth three times daily     • IRON CR  PO Take 1 tablet by mouth every evening. OTC     • amiodarone (CORDARONE) 200 MG Tab Take 200 mg by mouth every evening.     • lisinopril (PRINIVIL) 40 MG tablet Take 1 Tab by mouth every day. 90 Tab 3   • atorvastatin (LIPITOR) 80 MG tablet Take 1 Tab by mouth every evening. 30 Tab 5   • carvedilol (COREG) 25 MG Tab Take 1 Tab by mouth 2 times a day, with meals. 60 Tab 5   • doxazosin (CARDURA) 4 MG Tab Take 0.5 Tabs by mouth 2 Times a Day. (Patient not taking: Reported on 3/18/2021) 30 Tab 5   • aspirin  MG Tablet Delayed Response Take 1 Tab by mouth every day. (Patient not taking: Reported on 3/18/2021) 30 Tab 5     No facility-administered encounter medications on file as of 4/20/2021.     Social History     Socioeconomic History   • Marital status: Single     Spouse name: Not on file   • Number of children: Not on file   • Years of education: Not on file   • Highest education level: Not on file   Occupational History   • Not on file   Tobacco Use   • Smoking status: Heavy Tobacco Smoker     Packs/day: 1.50     Years: 52.00     Pack years: 78.00     Types: Cigarettes   • Smokeless tobacco: Never Used   Substance and Sexual Activity   • Alcohol use: Not Currently     Alcohol/week: 3.6 oz     Types: 6 Cans of beer per week     Comment: last new years 2021   • Drug use: Yes     Types: Inhaled     Comment: marijuana since 1968 daily, 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   • Sexual activity: Never   Other Topics Concern   • Not on file   Social History Narrative   • Not on file     Social Determinants of Health     Financial Resource Strain:    • Difficulty of Paying Living Expenses:    Food Insecurity:    • Worried About Running Out of Food in the Last Year:    • Ran Out of Food in the Last Year:    Transportation Needs:    • Lack of Transportation (Medical):    • Lack of Transportation (Non-Medical):    Physical Activity:    • Days of Exercise per Week:    • Minutes of Exercise per Session:    Stress:    • Feeling  of Stress :    Social Connections:    • Frequency of Communication with Friends and Family:    • Frequency of Social Gatherings with Friends and Family:    • Attends Anabaptism Services:    • Active Member of Clubs or Organizations:    • Attends Club or Organization Meetings:    • Marital Status:    Intimate Partner Violence:    • Fear of Current or Ex-Partner:    • Emotionally Abused:    • Physically Abused:    • Sexually Abused:       Social History     Tobacco Use   Smoking Status Heavy Tobacco Smoker   • Packs/day: 1.50   • Years: 52.00   • Pack years: 78.00   • Types: Cigarettes   Smokeless Tobacco Never Used     Social History     Substance and Sexual Activity   Alcohol Use Not Currently   • Alcohol/week: 3.6 oz   • Types: 6 Cans of beer per week    Comment: last new years 2021     Social History     Substance and Sexual Activity   Drug Use Yes   • Types: Inhaled    Comment: marijuana since 1968 daily, 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  No pertinent family history on file    ROS     Objective:   There were no vitals taken for this visit.    Physical Exam    Labs:  3/2/21  WBC Count 3.7 - 10.6 x10^3/ul 6.2    RBC Count 4.50 - 5.70 x10^6/ul 5.25    HGB 13.0 - 16.7 g/dl 16.7    Hct 38.8 - 49.7 % 49.1    MCV 83.0 - 99.0 fl 93.6    MCH 28.0 - 33.8 pg 31.9    MCHC 33.1 - 36.5 g/dL 34.1    RDW 11.8 - 14.0 % 14.2High     Platelet Count 146 - 390 x10^3/uL 146    MPV 6.4 - 10.2 fl 9.4       Sodium 136 - 144 mmol/L 134Low     POTASSIUM 3.6 - 5.1 mmol/L 4.2    Chloride 101 - 111 mmol/L 100Low     CARBON DIOXIDE 22 - 32 mmol/L 25    Anion Gap 2 - 11 mmol/L 10    Calcium, S/P 8.7 - 10.3 mg/dl 8.7Low     Glucose, Fasting 60 - 99 mg/dl 101High     BUN 8 - 20 mg/dl 17    Creatinine 0.80 - 1.40 mg/dl 1.73High     Protein, Total 6.4 - 8.2 g/dl 7.0    Albumin 3.5 - 4.8 g/dl 3.7    ALKALINE PHOSPHATASE 38 - 126 IU/L 57    ALT 17 - 63 IU/L 70High     AST 15 - 41 IU/L 46High     Bilirubin, Total 0.4 -  2.0 mg/dl 1.0    BILIRUBIN, DIRECT 0.0 - 0.3 mg/dl 0.3    Indirect Bilirubin 0.0 - 1.5 mg/dl 0.7    Globulin 2.6 - 3.1 g/dl 3.3High     Albumin/Globulin Ratio 1.00 - 2.20 Ratio 1.12    EGFRE Calc >60 ml/min/1.7 40       Alpha Fetoprotein L3% 0.5 - 9.9 % 29.2High       Vipul-gamma-carboxy Prothrombin <7.5 ng/mL 7.2           Imaging: CT ABD 4/18/21 CTH  Per my read,           4/18/21 CT CHEST CTH            Pathology: 3/18/21     FINAL DIAGNOSIS:     A. Liver tumor:          Well to moderately differentiated hepatocellular carcinoma.      Procedures: 3/18/21     1.  Laparoscopic microwave thermal ablation of a 3.5 cm left-sided hepatoma   between segment 2 and 3 posteriorly using multiple 4 minute at 100 watt   ablation cavities giving us approximately 4 x 4.5 cm ablation cavity   overlapping.  2.  Liver wedge resection of tumor for pathology segment 2/3    Diagnosis:     1. Hepatocellular carcinoma (HCC)     2. Cirrhosis of liver without ascites, unspecified hepatic cirrhosis type (HCC)     3. Hx of hepatitis C             Medical Decision Making:  Today's Assessment / Status / Plan:     ***

## 2021-04-20 ENCOUNTER — APPOINTMENT (OUTPATIENT)
Dept: SURGICAL ONCOLOGY | Facility: MEDICAL CENTER | Age: 61
End: 2021-04-20
Payer: MEDICARE

## 2025-01-27 NOTE — ANESTHESIA PREPROCEDURE EVALUATION
Relevant Problems   PULMONARY   (+) Hx of hepatitis C      NEURO   (+) Hx of hepatitis C      CARDIAC   (+) Congestive heart failure (HCC)   (+) Essential hypertension   (+) Occlusion of right vertebral artery   (+) Portal hypertension (HCC)         (+) CKD (chronic kidney disease) stage 3, GFR 30-59 ml/min   (+) Cirrhosis of liver without ascites (HCC)   (+) Hepatocellular carcinoma (HCC)      Other   (+) Marijuana dependence (HCC)   (+) Tobacco dependence       Physical Exam    Airway   Mallampati: II  TM distance: >3 FB  Neck ROM: full       Cardiovascular - normal exam  Rhythm: regular  Rate: normal  (-) murmur     Dental - normal exam      Very poor dentition   Pulmonary - normal exam  Breath sounds clear to auscultation     Abdominal    Neurological - normal exam                 Anesthesia Plan    ASA 3   ASA physical status 3 criteria: COPD and CAD/stents (> 3 months)    Plan - general       Airway plan will be ETT          Induction: intravenous    Postoperative Plan: Postoperative administration of opioids is intended.    Pertinent diagnostic labs and testing reviewed    Informed Consent:    Anesthetic plan and risks discussed with patient.    Use of blood products discussed with: patient whom consented to blood products.          Attending and PA/NP shared services statement (NON-critical care):

## (undated) DEVICE — SET TUBING PNEUMOCLEAR HIGH FLOW SMOKE EVACUATION (10EA/BX)

## (undated) DEVICE — SET EXTENSION WITH 2 PORTS (48EA/CA) ***PART #2C8610 IS A SUBSTITUTE*****

## (undated) DEVICE — SENSOR SPO2 NEO LNCS ADHESIVE (20/BX) SEE USER NOTES

## (undated) DEVICE — ELECTRODE 850 FOAM ADHESIVE - HYDROGEL RADIOTRNSPRNT (50/PK)

## (undated) DEVICE — GLOVE BIOGEL PI INDICATOR SZ 6.5 SURGICAL PF LF - (50/BX 4BX/CA)

## (undated) DEVICE — SLEEVE, VASO, THIGH, MED

## (undated) DEVICE — SPONGE GAUZESTER 4 X 4 4PLY - (128PK/CA)

## (undated) DEVICE — TROCAR 5X100 NON BLADED Z-TH - READ KII (6/BX)

## (undated) DEVICE — NEPTUNE 4 PORT MANIFOLD - (20/PK)

## (undated) DEVICE — SUCTION INSTRUMENT YANKAUER BULBOUS TIP W/O VENT (50EA/CA)

## (undated) DEVICE — DRESSING TRANSPARENT FILM TEGADERM 2.375 X 2.75"  (100EA/BX)"

## (undated) DEVICE — SODIUM CHL IRRIGATION 0.9% 1000ML (12EA/CA)

## (undated) DEVICE — PROTECTOR ULNA NERVE - (36PR/CA)

## (undated) DEVICE — CANNULA W/SEAL 5X100 Z-THRE - ADED KII (12/BX)

## (undated) DEVICE — CHLORAPREP 26 ML APPLICATOR - ORANGE TINT(25/CA)

## (undated) DEVICE — KIT ANESTHESIA W/CIRCUIT & 3/LT BAG W/FILTER (20EA/CA)

## (undated) DEVICE — CANISTER SUCTION 3000ML MECHANICAL FILTER AUTO SHUTOFF MEDI-VAC NONSTERILE LF DISP  (40EA/CA)

## (undated) DEVICE — DRAPESURG STERI-DRAPE LONG - (10/BX 4BX/CA)

## (undated) DEVICE — PACK LAP CHOLE OR - (2EA/CA)

## (undated) DEVICE — SUTURE 0 VICRYL PLUS UR-6 - 27 INCH (36/BX)

## (undated) DEVICE — SUTURE GENERAL

## (undated) DEVICE — SET SUCTION/IRRIGATION WITH DISPOSABLE TIP (6/CA )PART #0250-070-520 IS A SUB

## (undated) DEVICE — GOWN SURGICAL X-LARGE ULTRA - FILM-REINFORCED (20/CA)

## (undated) DEVICE — GOWN WARMING STANDARD FLEX - (30/CA)

## (undated) DEVICE — GLOVE BIOGEL SZ 8 SURGICAL PF LTX - (50PR/BX 4BX/CA)

## (undated) DEVICE — SODIUM CHL. IRRIGATION 0.9% 3000ML (4EA/CA 65CA/PF)

## (undated) DEVICE — HEAD HOLDER JUNIOR/ADULT

## (undated) DEVICE — DRAPE IOBAN II INCISE 23X17 - (10EA/BX 4BX/CA)

## (undated) DEVICE — TROCAR Z THREAD12MM OPTICAL - NON BLADED (6/BX)

## (undated) DEVICE — ELECTRODE DUAL RETURN W/ CORD - (50/PK)

## (undated) DEVICE — TROCAR LAPSCP 100MM 12MM NTHRD - (6/BX)

## (undated) DEVICE — TUBING CLEARLINK DUO-VENT - C-FLO (48EA/CA)

## (undated) DEVICE — CLOSURE SKIN STRIP 1/2 X 4 IN - (STERI STRIP) (50/BX 4BX/CA)

## (undated) DEVICE — GLOVE BIOGEL PI ORTHO SZ 7.5 PF LF (40PR/BX)

## (undated) DEVICE — LACTATED RINGERS INJ 1000 ML - (14EA/CA 60CA/PF)

## (undated) DEVICE — HANDPIECE THUNDERBEAT 5MM 35CM FRONT GRIP TYPE S (5EA/BX)

## (undated) DEVICE — BLADE SURGICAL CLIPPER - (50EA/CA)

## (undated) DEVICE — TUBE E-T HI-LO CUFF 8.0MM (10EA/PK)

## (undated) DEVICE — GLOVE SZ 6 BIOGEL PI MICRO - PF LF (50PR/BX 4BX/CA)

## (undated) DEVICE — MASK ANESTHESIA ADULT  - (100/CA)

## (undated) DEVICE — SET LEADWIRE 5 LEAD BEDSIDE DISPOSABLE ECG (1SET OF 5/EA)

## (undated) DEVICE — PROBE SOLERO MICROWAVE APPLICATOR 29CM

## (undated) DEVICE — TUBE CONNECT SUCTION CLEAR 120 X 1/4" (50EA/CA)"

## (undated) DEVICE — STAPLER SKIN DISP - (6/BX 10BX/CA) VISISTAT